# Patient Record
Sex: FEMALE | Race: WHITE
[De-identification: names, ages, dates, MRNs, and addresses within clinical notes are randomized per-mention and may not be internally consistent; named-entity substitution may affect disease eponyms.]

---

## 2017-04-26 ENCOUNTER — HOSPITAL ENCOUNTER (EMERGENCY)
Dept: HOSPITAL 41 - JD.ED | Age: 27
Discharge: HOME | End: 2017-04-26
Payer: COMMERCIAL

## 2017-04-26 DIAGNOSIS — Z3A.08: ICD-10-CM

## 2017-04-26 DIAGNOSIS — R10.32: ICD-10-CM

## 2017-04-26 DIAGNOSIS — O26.891: Primary | ICD-10-CM

## 2017-04-26 PROCEDURE — 36415 COLL VENOUS BLD VENIPUNCTURE: CPT

## 2017-04-26 PROCEDURE — 86850 RBC ANTIBODY SCREEN: CPT

## 2017-04-26 PROCEDURE — 81001 URINALYSIS AUTO W/SCOPE: CPT

## 2017-04-26 PROCEDURE — 86901 BLOOD TYPING SEROLOGIC RH(D): CPT

## 2017-04-26 PROCEDURE — 80053 COMPREHEN METABOLIC PANEL: CPT

## 2017-04-26 PROCEDURE — 76817 TRANSVAGINAL US OBSTETRIC: CPT

## 2017-04-26 PROCEDURE — 86900 BLOOD TYPING SEROLOGIC ABO: CPT

## 2017-04-26 PROCEDURE — 99284 EMERGENCY DEPT VISIT MOD MDM: CPT

## 2017-04-26 PROCEDURE — 96361 HYDRATE IV INFUSION ADD-ON: CPT

## 2017-04-26 PROCEDURE — 84703 CHORIONIC GONADOTROPIN ASSAY: CPT

## 2017-04-26 PROCEDURE — 86140 C-REACTIVE PROTEIN: CPT

## 2017-04-26 PROCEDURE — 96360 HYDRATION IV INFUSION INIT: CPT

## 2017-04-26 PROCEDURE — 85025 COMPLETE CBC W/AUTO DIFF WBC: CPT

## 2017-04-26 NOTE — US
First trimester obstetrical ultrasound: Multiple real-time images were

 obtained transvaginally.

 

Comparison: No previous study.

 

Small intrauterine gestational sac is seen.  No fetal pole or yolk sac

 is seen which may relate to the small size of the gestational sac.  

Varicosities are identified within the pelvis.  Maternal ovaries are 

unremarkable.

 

Measurements:

Gestational sac: 0.73 cm - 5 weeks 2 days

 

Impression:

1.  Small intrauterine gestational sac with no fetal pole or yolk sac 

is identified.  This may relate to early age of pregnancy, recommend 

follow-up exam in 10 days to further evaluate.

2.  Incidental varicosities within the pelvis but can cause symptoms 

of pelvic congestion.

3.  No additional abnormality is identified.

 

Diagnostic code #3

## 2017-04-26 NOTE — EDM.PDOC
ED HPI PREGNANCY





- General


Chief Complaint: OB/GYN Problem


Stated Complaint: 7 WEEKS PREGNANT, ABDOMINAL PAIN


Time Seen by Provider: 17 17:12


Source of Information: Reports: Patient


History Limitations: Reports: No limitations





- History of Present Illness


INITIAL COMMENTS - FREE TEXT/NARRATIVE: 


Patient is a 26-year-old female who reports she 7 weeks 5 days pregnant.  

Presents the ED complaining of left lower quadrant abdominal/pelvic pain.  

Patient states the pain comes and goes every 10-15 minutes.  At its peak pain 

is described as sharp, dull, throbbing rated a 7/10.  Pain is worsened with 

sitting or bending for.  Occasionally she has some low back pain with it.  She 

has not taken any medications to alleviate this discomfort.  States there is 

nothing that helps but leading that resolved on its own.  She's had no pain 

with urination.  States her bowel movements have increased to 5 per day.  She 

is resting as being soft, with no blood, and no straining her car.  Last bowel 

movement was at 1320 today.  She denies any increased vaginal.  She denies any 

history of similar symptoms.  She does note follicle cyst approximately one 

year ago bilaterally.  She had intercourse 2 days ago with no increased pain 

noted.  She denies any fever/chills, dysuria, dizziness, diarrhea, shortness 

breath, chest pain.





Last menstrual cycle was 2017.  She is 7 weeks 3 days pregnant.  She 

neck followup visit is with Dr. Condon the 2017.  Pregnancy history 

 5 para 2  one miscarriage one





Past medical history no additional contributory history.  Current medications 

prenatal.  Surgical history ear tube.





Patient does not smoke/recently quit, on-call negative, recreational drugs 

negative.


Timing/Duration: Reports: Intermittent


Location, Pregnancy: Reports: abdomen (Left lower quadrant)


Quality: Reports: ache, stabbing, throbbing, sharp


Severity: moderate (At its peak)


Improves with: Reports: None


Worsens with: Reports: Other (Sitting and bending forward)


Associated Symptoms: Denies: vaginal bleeding, vaginal clots, vaginal tissue, 

vaginal discharge, vaginal fluid


Treatments PTA: Reports: Other (see below) (None stated)





- Related Data


Allergies/ADRs: 


 Allergies











Allergy/AdvReac Type Severity Reaction Status Date / Time


 


No Known Allergies Allergy   Verified 17 17:06











Home Meds: 


 Home Meds





. [No Known Home Meds]  17 [History]











ED ROS GENERAL





- Review of Systems


Review Of Systems: See Below


Constitutional: Reports: no symptoms


Respiratory: Reports: No Symptoms


Cardiovascular: Reports: No symptoms


GI/Abdominal: Reports: Abdominal pain.  Denies: Black stool, Bloody stool, 

Constipation, Diarrhea, Decreased appetite, Distension, Flatus, Melena, Nausea, 

Vomiting


: Denies: dysuria, flank pain, frequency, hematuria, urgency


Musculoskeletal: Reports: back pain (Intermittent low back pain with onset)


Neurological: Denies: Dizziness





ED EXAM PREGNANCY





- Physical Exam


Exam: See Below


Exam Limited By: No limitations


General Appearance: alert, WD/WN, no apparent distress


Ears: hearing grossly normal


Nose: normal inspection


Throat/Mouth: Normal voice, No airway compromise


Neck: normal inspection


Respiratory/Chest: no respiratory distress, lungs clear, normal breath sounds


Cardiovascular: normal peripheral pulses, regular rate, rhythm, no murmur


GI/Abdominal: normal bowel sounds, soft, no organomegaly, no distention, tender 

(To the left lower quadrant/pelvic region.)


Back Exam: normal inspection.  No: CVA tenderness (L), CVA tenderness (R)


Neurological: alert, oriented, CN II-XII intact, normal cognition, no motor/

sensory deficits


Psychiatric: normal affect, normal mood


Skin Exam: Warm, Dry, Intact, Normal color





Course





- Vital Signs


Last Recorded V/S: 


 Last Vital Signs











Temp  97.3 F   17 17:06


 


Pulse  87   17 17:06


 


Resp  16   17 17:06


 


BP  106/72   17 17:06


 


Pulse Ox  99   17 17:06














- Orders/Labs/Meds


Orders: 


 Active Orders 24 hr











 Category Date Time Status


 


 Peripheral IV Care [RC] .AS DIRECTED Care  17 17:32 Active


 


 PATIENT RETYPE [BBK] Stat Lab  17 17:48 Results


 


 TYPE AND SCREEN [BBK] Stat Lab  17 17:48 Results


 


 Peripheral IV Insertion Adult [OM.PC] Stat Oth  17 17:32 Ordered











Labs: 


 Laboratory Tests











  17 Range/Units





  17:48 17:48 17:48 


 


WBC  11.44 H    (3.98-10.04)  K/mm3


 


RBC  4.89    (3.98-5.22)  M/mm3


 


Hgb  15.0    (11.2-15.7)  gm/L


 


Hct  43.8    (34.1-44.9)  %


 


MCV  89.6    (79.4-94.8)  fl


 


MCH  30.7    (25.6-32.2)  pg


 


MCHC  34.2    (32.2-35.5)  g/dl


 


RDW Std Deviation  41.1    (36.4-46.3)  fL


 


Plt Count  221    (182-369)  K/mm3


 


MPV  10.6    (9.4-12.3)  fl


 


Neut % (Auto)  69.1    (34.0-71.1)  %


 


Lymph % (Auto)  21.1    (19.3-51.7)  %


 


Mono % (Auto)  8.6    (4.7-12.5)  %


 


Eos % (Auto)  0.8    (0.7-5.8)  


 


Baso % (Auto)  0.2    (0.1-1.2)  %


 


Neut # (Auto)  7.92 H    (1.56-6.13)  K/mm3


 


Lymph # (Auto)  2.41    (1.18-3.74)  K/mm3


 


Mono # (Auto)  0.98 H    (0.24-0.36)  K/mm3


 


Eos # (Auto)  0.09    (0.04-0.36)  K/mm3


 


Baso # (Auto)  0.02    (0.01-0.08)  K/mm3


 


Sodium   139   (136-145)  mEq/L


 


Potassium   3.7   (3.5-5.1)  mEq/L


 


Chloride   105   ()  mEq/L


 


Carbon Dioxide   25   (21-32)  mEq/L


 


Anion Gap   12.7   (5-15)  


 


BUN   7   (7-18)  mg/dL


 


Creatinine   0.6   (0.55-1.02)  mg/dL


 


Est Cr Clr Drug Dosing   TNP   


 


Estimated GFR (MDRD)   > 60   (>60)  mL/min


 


BUN/Creatinine Ratio   11.7 L   (14-18)  


 


Glucose   107 H   ()  mg/dL


 


Calcium   9.2   (8.5-10.1)  mg/dL


 


Total Bilirubin   1.3 H   (0.2-1.0)  mg/dL


 


AST   19   (15-37)  U/L


 


ALT   32   (14-59)  U/L


 


Alkaline Phosphatase   65   ()  U/L


 


C-Reactive Protein   < 0.2   (<1.0)  mg/dL


 


Total Protein   7.4   (6.4-8.2)  g/dl


 


Albumin   4.2   (3.4-5.0)  g/dl


 


Globulin   3.2   gm/dL


 


Albumin/Globulin Ratio   1.3   (1-2)  


 


HCG, Qual     (NEGATIVE)  


 


Urine Color     (Yellow)  


 


Urine Appearance     (Clear)  


 


Urine pH     (5.0-8.0)  


 


Ur Specific Gravity     (1.005-1.030)  


 


Urine Protein     (Negative)  


 


Urine Glucose (UA)     (Negative)  


 


Urine Ketones     (Negative)  


 


Urine Occult Blood     (Negative)  


 


Urine Nitrite     (Negative)  


 


Urine Bilirubin     (Negative)  


 


Urine Urobilinogen     (0.2-1.0)  


 


Ur Leukocyte Esterase     (Negative)  


 


Urine RBC     (0-5)  /hpf


 


Urine WBC     (0-5)  /hpf


 


Ur Epithelial Cells     


 


Ur Squamous Epith Cells     (0-5)  /hpf


 


Amorphous Sediment     (NOT SEEN)  /hpf


 


Urine Bacteria     (FEW)  /hpf


 


Urine Mucus     (FEW)  /hpf


 


Blood Type    O NEGATIVE  


 


Gel Antibody Screen    Negative  














  17 Range/Units





  17:48 18:00 


 


WBC    (3.98-10.04)  K/mm3


 


RBC    (3.98-5.22)  M/mm3


 


Hgb    (11.2-15.7)  gm/L


 


Hct    (34.1-44.9)  %


 


MCV    (79.4-94.8)  fl


 


MCH    (25.6-32.2)  pg


 


MCHC    (32.2-35.5)  g/dl


 


RDW Std Deviation    (36.4-46.3)  fL


 


Plt Count    (182-369)  K/mm3


 


MPV    (9.4-12.3)  fl


 


Neut % (Auto)    (34.0-71.1)  %


 


Lymph % (Auto)    (19.3-51.7)  %


 


Mono % (Auto)    (4.7-12.5)  %


 


Eos % (Auto)    (0.7-5.8)  


 


Baso % (Auto)    (0.1-1.2)  %


 


Neut # (Auto)    (1.56-6.13)  K/mm3


 


Lymph # (Auto)    (1.18-3.74)  K/mm3


 


Mono # (Auto)    (0.24-0.36)  K/mm3


 


Eos # (Auto)    (0.04-0.36)  K/mm3


 


Baso # (Auto)    (0.01-0.08)  K/mm3


 


Sodium    (136-145)  mEq/L


 


Potassium    (3.5-5.1)  mEq/L


 


Chloride    ()  mEq/L


 


Carbon Dioxide    (21-32)  mEq/L


 


Anion Gap    (5-15)  


 


BUN    (7-18)  mg/dL


 


Creatinine    (0.55-1.02)  mg/dL


 


Est Cr Clr Drug Dosing    


 


Estimated GFR (MDRD)    (>60)  mL/min


 


BUN/Creatinine Ratio    (14-18)  


 


Glucose    ()  mg/dL


 


Calcium    (8.5-10.1)  mg/dL


 


Total Bilirubin    (0.2-1.0)  mg/dL


 


AST    (15-37)  U/L


 


ALT    (14-59)  U/L


 


Alkaline Phosphatase    ()  U/L


 


C-Reactive Protein    (<1.0)  mg/dL


 


Total Protein    (6.4-8.2)  g/dl


 


Albumin    (3.4-5.0)  g/dl


 


Globulin    gm/dL


 


Albumin/Globulin Ratio    (1-2)  


 


HCG, Qual  Positive H   (NEGATIVE)  


 


Urine Color   Light yellow  (Yellow)  


 


Urine Appearance   Clear  (Clear)  


 


Urine pH   7.0  (5.0-8.0)  


 


Ur Specific Gravity   1.025  (1.005-1.030)  


 


Urine Protein   Negative  (Negative)  


 


Urine Glucose (UA)   Negative  (Negative)  


 


Urine Ketones   Negative  (Negative)  


 


Urine Occult Blood   Negative  (Negative)  


 


Urine Nitrite   Negative  (Negative)  


 


Urine Bilirubin   Negative  (Negative)  


 


Urine Urobilinogen   1.0  (0.2-1.0)  


 


Ur Leukocyte Esterase   Negative  (Negative)  


 


Urine RBC   Not seen  (0-5)  /hpf


 


Urine WBC   0-5  (0-5)  /hpf


 


Ur Epithelial Cells   Not Reportable  


 


Ur Squamous Epith Cells   0-5  (0-5)  /hpf


 


Amorphous Sediment   Many H  (NOT SEEN)  /hpf


 


Urine Bacteria   Moderate H  (FEW)  /hpf


 


Urine Mucus   Not seen  (FEW)  /hpf


 


Blood Type    


 


Gel Antibody Screen    











Meds: 


Medications














Discontinued Medications














Generic Name Dose Route Start Last Admin





  Trade Name Freq  PRN Reason Stop Dose Admin


 


Sodium Chloride  1,000 mls @ 125 mls/hr  17 17:45  17 17:53





  Normal Saline  IV   125 mls/hr





  ASDIRECTED OUMOU   Administration


 


Sodium Chloride  10 ml  17 17:32  17 17:54





  Saline Flush  FLUSH   10 ml





  ASDIRECTED PRN   Administration





  Keep Vein Open   














- Re-Assessments/Exams


Free Text/Narrative Re-Assessment/Exam: 





17 17:29 Ordered peripheral IV with NS 125mls/hr, CBC, chem 14, CRP, hCG 

quantitative, type and screen, UA, and transvaginal OB ultrasound.  





Labs reviewed: White blood cell count 11.44, hemoglobin 16.0, chemistry panel 

essentially normal, CRP less than 0.2.  UA negative for infectious properties.





17 19:03 Transvaginal ultrasound impression: Small intrauterine 

gestational sac with no fetal pole or yolk sac is identified.  This may related 

to early age of pregnancy, recommend followup exam in 10 days to further 

evaluate.  Incidental varicosities within the pelvis but can cause symptoms of 

pelvic congestion.  No additional abnormalities identified.  Gestational sac 

measurements: 0.73 cm-5 weeks 2 days.





17 19:05 HCG quantitative was not ordered.  Ordered at this time. 





Blood type O Negative.  





17 19:21 Shared results of labs and ultrasound with patient.  She is 

ready to be discharged home. Discharge instructions as documented. 











Departure





- Departure


Time of Disposition: 19:21


Disposition: Home, Self-Care 01


Condition: good


Clinical Impression: 


 Intrauterine pregnancy, Abdominal pain during intrauterine pregnancy





Instructions:  Abdominal Pain During Pregnancy, Easy-to-Read


Referrals: 


Cindy Tovar MD [Primary Care Provider] - 


Forms:  ED Department Discharge


Additional Instructions: 


Follow up with Dr. Tovar in 10 days for reevaluation. Take tylenol 650mg 

every 6 hours for pain.  Return to the E.D. for any new or worsening symptoms. 





- My Orders


Last 24 Hours: 


My Active Orders





17 17:32


Peripheral IV Care [RC] .AS DIRECTED 


Peripheral IV Insertion Adult [OM.PC] Stat 





17 17:48


PATIENT RETYPE [BBK] Stat 


TYPE AND SCREEN [BBK] Stat 














- Assessment/Plan


Last 24 Hours: 


My Active Orders





17 17:32


Peripheral IV Care [RC] .AS DIRECTED 


Peripheral IV Insertion Adult [OM.PC] Stat 





17 17:48


PATIENT RETYPE [BBK] Stat 


TYPE AND SCREEN [BBK] Stat

## 2018-04-30 ENCOUNTER — HOSPITAL ENCOUNTER (INPATIENT)
Dept: HOSPITAL 41 - JD.OBCHECK | Age: 28
LOS: 2 days | Discharge: HOME | DRG: 560 | End: 2018-05-02
Attending: OBSTETRICS & GYNECOLOGY | Admitting: OBSTETRICS & GYNECOLOGY
Payer: COMMERCIAL

## 2018-04-30 DIAGNOSIS — F17.200: ICD-10-CM

## 2018-04-30 DIAGNOSIS — Z3A.37: ICD-10-CM

## 2018-04-30 PROCEDURE — 10907ZC DRAINAGE OF AMNIOTIC FLUID, THERAPEUTIC FROM PRODUCTS OF CONCEPTION, VIA NATURAL OR ARTIFICIAL OPENING: ICD-10-PCS | Performed by: OBSTETRICS & GYNECOLOGY

## 2018-04-30 PROCEDURE — 3E0R3BZ INTRODUCTION OF ANESTHETIC AGENT INTO SPINAL CANAL, PERCUTANEOUS APPROACH: ICD-10-PCS

## 2018-04-30 PROCEDURE — 00HU33Z INSERTION OF INFUSION DEVICE INTO SPINAL CANAL, PERCUTANEOUS APPROACH: ICD-10-PCS

## 2018-04-30 NOTE — PCM.PREANE
Preanesthetic Assessment





- Procedure


Proposed Procedure: 





Spinal for labor 





- Anesthesia/Transfusion/Family Hx


Anesthesia History: Prior Anesthesia Without Reaction


Family History of Anesthesia Reaction: No


Transfusion History: No Prior Transfusion(s)


Intubation History: Unknown





- Review of Systems


General: No Symptoms


Pulmonary: No Symptoms


Cardiovascular: No Symptoms


Gastrointestinal: Other (GERD)


Neurological: No Symptoms


Other: Reports: Diabetes (Gestational )





- Physical Assessment


NPO Status Date: 04/30/18


NPO Status Time: 13:00


Pulse: 65


O2 Sat by Pulse Oximetry: 98


Respiratory Rate: 14


Blood Pressure: 107/58


Temperature: 36.1 C


Height: 1.63 m


Weight: 88.904 kg


ASA Class: 2


Mental Status: Alert & Oriented x3


Airway Class: Mallampati = 1


Dentition: Reports: Normal Dentition


Thyro-Mental Finger Breadths: 3


Mouth Opening Finger Breadths: 3


ROM/Head Extension: Full


Lungs: Clear to Auscultation, Normal Respiratory Effort


Cardiovascular: Regular Rate, Regular Rhythm





- Lab


Values: 





 Laboratory Last Values











WBC  13.98 K/mm3 (3.98-10.04)  H  04/30/18  16:36    


 


RBC  4.27 M/mm3 (3.98-5.22)   04/30/18  16:36    


 


Hgb  12.1 gm/L (11.2-15.7)   04/30/18  16:36    


 


Hct  36.0 % (34.1-44.9)   04/30/18  16:36    


 


MCV  84.3 fl (79.4-94.8)   04/30/18  16:36    


 


MCH  28.3 pg (25.6-32.2)   04/30/18  16:36    


 


MCHC  33.6 g/dl (32.2-35.5)   04/30/18  16:36    


 


RDW Std Deviation  43.1 fL (36.4-46.3)   04/30/18  16:36    


 


Plt Count  213 K/mm3 (182-369)   04/30/18  16:36    


 


MPV  11.2 fl (9.4-12.3)   04/30/18  16:36    


 


Neut % (Auto)  77.2 % (34.0-71.1)  H  04/30/18  16:36    


 


Lymph % (Auto)  11.1 % (19.3-51.7)  L  04/30/18  16:36    


 


Mono % (Auto)  10.4 % (4.7-12.5)   04/30/18  16:36    


 


Eos % (Auto)  0.6  (0.7-5.8)  L  04/30/18  16:36    


 


Baso % (Auto)  0.1 % (0.1-1.2)   04/30/18  16:36    


 


Neut # (Auto)  10.80 K/mm3 (1.56-6.13)  H  04/30/18  16:36    


 


Lymph # (Auto)  1.55 K/mm3 (1.18-3.74)   04/30/18  16:36    


 


Mono # (Auto)  1.45 K/mm3 (0.24-0.36)  H  04/30/18  16:36    


 


Eos # (Auto)  0.09 K/mm3 (0.04-0.36)   04/30/18  16:36    


 


Baso # (Auto)  0.01 K/mm3 (0.01-0.08)   04/30/18  16:36    


 


POC Glucose  96 mg/dL ()   04/30/18  16:50    














- Allergies


Allergies/Adverse Reactions: 


 Allergies











Allergy/AdvReac Type Severity Reaction Status Date / Time


 


No Known Allergies Allergy   Verified 04/26/17 17:06














- Blood


Blood Available: No


Product(s) Available: None





- Anesthesia Plan


Pre-Op Medication Ordered: None





- Acknowledgements


Anesthesia Type Planned: Spinal (Pt desired a spinal rather than an epidural at 

this time.  Pt dilated to an 8.  She is aware of the spinal only lasting about 

an hour and then will not have any regional pain control thereafter and still 

wishes to proceed.  )


Pt an Appropriate Candidate for the Planned Anesthesia: Yes


Alternatives and Risks of Anesthesia Discussed w Pt/Guardian: Yes


Pt/Guardian Understands and Agrees with Anesthesia Plan: Yes





PreAnesthesia Questionnaire





- Past Health History


Medical/Surgical History: Denies Medical/Surgical History





- SUBSTANCE USE


Smoking Status *Q: Current Some Day Smoker


Recreational Drug Use History: No





- HOME MEDS


Home Medications: 


 Home Meds





. [No Known Home Meds]  04/26/17 [History]











- CURRENT (IN HOUSE) MEDS


Current Meds: 





 Current Medications





Lactated Ringer's (Ringers, Lactated)  1,000 mls @ 100 mls/hr IV ASDIRECTED OUMOU


   Last Admin: 04/30/18 17:02 Dose:  100 mls/hr


Oxytocin/Lactated Ringer's (Pitocin In Lr 10 Units/1,000 Ml)  10 unit in 1,000 

mls @ 500 mls/hr IV .CONTINUOUS OUMOU


Ampicillin Sodium 2 gm/ Sodium (Chloride)  100 mls @ 200 mls/hr IV Q6H OUMOU


   Last Admin: 04/30/18 17:04 Dose:  200 mls/hr


Nalbuphine HCl (Nubain)  10 mg IVPUSH Q2H PRN


   PRN Reason: Pain (moderate 4-6)


Sodium Chloride (Saline Flush)  10 ml FLUSH ASDIRECTED PRN


   PRN Reason: Keep Vein Open





Discontinued Medications





Fentanyl (Sublimaze) Confirm Administered Dose 100 mcg .ROUTE .STK-MED ONE


   Stop: 04/30/18 17:09


Oxytocin/Lactated Ringer's (Pitocin In Lr 10 Units/1,000 Ml) Confirm 

Administered Dose 10 unit in 1,000 mls @ as directed IV .STK-MED ONE


   Stop: 04/30/18 16:42


Sodium Chloride (Normal Saline) Confirm Administered Dose 100 mls @ as directed 

.ROUTE .STK-MED ONE


   Stop: 04/30/18 16:51


Nalbuphine HCl (Nubain) Confirm Administered Dose 20 mg .ROUTE .STK-MED ONE


   Stop: 04/30/18 16:34


   Last Admin: 04/30/18 16:52 Dose:  20 mg

## 2018-04-30 NOTE — PCM.LDHP
L&D History of Present Illness





- General


Date of Service: 18


Admit Problem/Dx: 


 Patient Status Order with Admit Dx/Problem





18 16:35


Patient Status [ADT] Routine 








 Admission Diagnosis/Problem











Admission Diagnosis/Problem    Pregnancy














Source of Information: Patient


History Limitations: Reports: No Limitations





- History of Present Illness


Introduction:: 





27 year old  at 37w3 here in labor, 


PNC complicated by A2GDM on oral medication and GBS positive.


Pain Score: 10





- Related Data


Allergies/Adverse Reactions: 


 Allergies











Allergy/AdvReac Type Severity Reaction Status Date / Time


 


No Known Allergies Allergy   Verified 17 17:06











Home Medications: 


 Home Meds





. [No Known Home Meds]  17 [History]











Past Medical History





- Past Health History


Medical/Surgical History: Denies Medical/Surgical History





Social & Family History





- Tobacco Use


Smoking Status *Q: Current Some Day Smoker


Years of Tobacco use: 3


Packs/Tins Daily: 0.1





- Caffeine Use


Caffeine Use: Reports: None





- Recreational Drug Use


Recreational Drug Use: No





H&P Review of Systems





- Review of Systems:


Review Of Systems: See Below


General: Reports: No Symptoms


HEENT: Reports: No Symptoms


Pulmonary: Reports: No Symptoms


Cardiovascular: Reports: No Symptoms


Gastrointestinal: Reports: No Symptoms


Musculoskeletal: Reports: No Symptoms


Skin: Reports: No Symptoms


Psychiatric: Reports: No Symptoms


Neurological: Reports: No Symptoms


Hematologic/Lymphatic: Reports: No Symptoms


Immunologic: Reports: No Symptoms





L&D Exam





- Exam


Exam: See Below





- OB Specific


Contraction Intensity: Moderate to Strong


Fetal Movement: Active


Fetal Heart Tones: Present


Fetal Heart Tones per Min: 150


Fetal Heart Rate (FHR) Variability: Moderate (6-25 bmp)


Birth Presentation: Vertex





- Evangelista Score


Evangelista Score Cervix Position: Midposition


Evangelista Score Consistency: Soft


Evangelista Score Effacement: >80%


Evangelista Score Dilation: > 5 cm


Evangelista Score Infant's Station: -1 ,0


Evangelista Score Total: 11





- Exam


General: Alert, Oriented


HEENT: PERRLA, Conjunctiva Clear, EACs Clear, EOMI, Hearing Intact, Mucosa 

Moist & Pink, Nares Patent, Normal Nasal Septum, Posterior Pharynx Clear, TMs 

Clear


Neck: Supple, Trachea Midline


Lungs: Clear to Auscultation, Normal Respiratory Effort


Cardiovascular: Regular Rate, Regular Rhythm


GI/Abdominal Exam: Normal Bowel Sounds, Soft, Non-Tender, No Organomegaly, No 

Distention, No Abnormal Bruit, No Mass, Pelvis Stable


Rectal Exam: Normal Rectal Tone


Genitourinary: Other (arom meconium fluid)


Back Exam: Normal Inspection, Full Range of Motion


Extremities: Normal Inspection, Normal Range of Motion, Non-Tender, No Pedal 

Edema, Normal Capillary Refill


Skin: Warm, Dry, Intact


Neurological: Cranial Nerves Intact, Reflexes Equal Bilateral


Psychiatric: Alert, Normal Affect, Normal Mood





- Patient Data


Lab Results Last 24 hrs: 


 Laboratory Results - last 24 hr











  18 Range/Units





  16:36 16:50 


 


WBC  13.98 H   (3.98-10.04)  K/mm3


 


RBC  4.27   (3.98-5.22)  M/mm3


 


Hgb  12.1   (11.2-15.7)  gm/L


 


Hct  36.0   (34.1-44.9)  %


 


MCV  84.3   (79.4-94.8)  fl


 


MCH  28.3   (25.6-32.2)  pg


 


MCHC  33.6   (32.2-35.5)  g/dl


 


RDW Std Deviation  43.1   (36.4-46.3)  fL


 


Plt Count  213   (182-369)  K/mm3


 


MPV  11.2   (9.4-12.3)  fl


 


Neut % (Auto)  77.2 H   (34.0-71.1)  %


 


Lymph % (Auto)  11.1 L   (19.3-51.7)  %


 


Mono % (Auto)  10.4   (4.7-12.5)  %


 


Eos % (Auto)  0.6 L   (0.7-5.8)  


 


Baso % (Auto)  0.1   (0.1-1.2)  %


 


Neut # (Auto)  10.80 H   (1.56-6.13)  K/mm3


 


Lymph # (Auto)  1.55   (1.18-3.74)  K/mm3


 


Mono # (Auto)  1.45 H   (0.24-0.36)  K/mm3


 


Eos # (Auto)  0.09   (0.04-0.36)  K/mm3


 


Baso # (Auto)  0.01   (0.01-0.08)  K/mm3


 


POC Glucose   96  ()  mg/dL











Result Diagrams: 


 18 16:36





Problem List Initiated/Reviewed/Updated: Yes


Orders Last 24hrs: 


 Active Orders 24 hr











 Category Date Time Status


 


 Patient Status [ADT] Routine ADT  18 16:35 Active


 


 Activity as Tolerated [RC] PFP Care  18 16:34 Active


 


 Communication Order [RC] ASDIRECTED Care  18 16:34 Active


 


 Fetal Heart Tones [RC] ASDIRECTED Care  18 16:35 Active


 


 Notify Provider [RC] PFP Care  18 16:34 Active


 


 Notify Provider [RC] PRN Care  18 16:34 Active


 


 Peripheral IV Care [RC] .AS DIRECTED Care  18 16:35 Active


 


 Pump Management, Intrathecal [RC] ASDIRECTED Care  18 16:36 Active


 


 Urinary Catheter Assessment [RC] ASDIRECTED Care  18 16:34 Active


 


 Vital Signs [RC] PER UNIT ROUTINE Care  18 16:34 Active


 


 Ampicillin 2 gm Med  18 17:00 Active





 Sodium Chloride 0.9% [Normal Saline] 100 ml   





 IV Q6H   


 


 Lactated Ringers [Ringers, Lactated] 1,000 ml Med  18 16:45 Active





 IV ASDIRECTED   


 


 Nalbuphine [Nubain] Med  18 16:34 Active





 10 mg IVPUSH Q2H PRN   


 


 Oxytocin/Lactated Ringers [Pitocin in LR 10 Units/1,000 Med  18 16:45 

Active





 ML]   





 10 unit in 1,000 ml IV .CONTINUOUS   


 


 Sodium Chloride 0.9% [Saline Flush] Med  18 16:34 Active





 10 ml FLUSH ASDIRECTED PRN   


 


 Electronic Fetal Heart Tones Ext w TOCO [WOMSER] Oth  18 16:34 Ordered





 Routine   


 


 Electronic Fetal Heart Tones Internal [WOMSER] Per Unit Oth  18 16:34 

Ordered





 Routine   


 


 Peripheral IV Insertion Adult [OM.PC] Routine Oth  18 16:34 Ordered


 


 Resuscitation Status Routine Resus Stat  18 16:34 Ordered








 Medication Orders





Lactated Ringer's (Ringers, Lactated)  1,000 mls @ 100 mls/hr IV ASDIRECTED OUMOU


   Last Admin: 18 17:02  Dose: 100 mls/hr


Oxytocin/Lactated Ringer's (Pitocin In Lr 10 Units/1,000 Ml)  10 unit in 1,000 

mls @ 500 mls/hr IV .CONTINUOUS OUMOU


Ampicillin Sodium 2 gm/ Sodium (Chloride)  100 mls @ 200 mls/hr IV Q6H [facility]


   Last Admin: 18 17:04  Dose: 200 mls/hr


Nalbuphine HCl (Nubain)  10 mg IVPUSH Q2H PRN


   PRN Reason: Pain (moderate 4-6)


Sodium Chloride (Saline Flush)  10 ml FLUSH ASDIRECTED PRN


   PRN Reason: Keep Vein Open








Assessment/Plan Comment:: 





Term labor.


Antibiotics

## 2018-05-01 NOTE — PCM.PNPP
- General Info


Date of Service: 18


Subjective Update: 





PPD1. Doing great. Minimal lochia. Minimal pain. More sore from pushing.


Functional Status: Reports: Pain Controlled





- Review of Systems


General: Reports: No Symptoms


HEENT: Reports: No Symptoms


Pulmonary: Reports: No Symptoms


Cardiovascular: Reports: No Symptoms


Gastrointestinal: Reports: No Symptoms


Genitourinary: Reports: No Symptoms


Musculoskeletal: Reports: No Symptoms


Skin: Reports: No Symptoms


Neurological: Reports: No Symptoms


Psychiatric: Reports: No Symptoms





- General Info


Date of Service: 18





- Patient Data


Vital Signs - Most Recent: 


 Last Vital Signs











Temp  36.1 C   18 17:36


 


Pulse  78   18 01:37


 


Resp  14   18 01:37


 


BP  111/61   18 01:37


 


Pulse Ox  97   18 01:37











Weight - Most Recent: 88.904 kg


Lab Results - Last 24 Hours: 


 Laboratory Results - last 24 hr











  18 Range/Units





  16:36 16:50 


 


WBC  13.98 H   (3.98-10.04)  K/mm3


 


RBC  4.27   (3.98-5.22)  M/mm3


 


Hgb  12.1   (11.2-15.7)  gm/L


 


Hct  36.0   (34.1-44.9)  %


 


MCV  84.3   (79.4-94.8)  fl


 


MCH  28.3   (25.6-32.2)  pg


 


MCHC  33.6   (32.2-35.5)  g/dl


 


RDW Std Deviation  43.1   (36.4-46.3)  fL


 


Plt Count  213   (182-369)  K/mm3


 


MPV  11.2   (9.4-12.3)  fl


 


Neut % (Auto)  77.2 H   (34.0-71.1)  %


 


Lymph % (Auto)  11.1 L   (19.3-51.7)  %


 


Mono % (Auto)  10.4   (4.7-12.5)  %


 


Eos % (Auto)  0.6 L   (0.7-5.8)  


 


Baso % (Auto)  0.1   (0.1-1.2)  %


 


Neut # (Auto)  10.80 H   (1.56-6.13)  K/mm3


 


Lymph # (Auto)  1.55   (1.18-3.74)  K/mm3


 


Mono # (Auto)  1.45 H   (0.24-0.36)  K/mm3


 


Eos # (Auto)  0.09   (0.04-0.36)  K/mm3


 


Baso # (Auto)  0.01   (0.01-0.08)  K/mm3


 


POC Glucose   96  ()  mg/dL











Med Orders - Current: 


 Current Medications





Benzocaine/Menthol (Dermoplast Pain Relief Spray)  0 gm TOP ASDIRECTED PRN


   PRN Reason: Perineal Comfort Measure


Ibuprofen (Motrin)  600 mg PO Q6H PRN


   PRN Reason: Mild pain or fever


   Last Admin: 18 21:49 Dose:  600 mg


Witch Hazel (Tucks)  1 pad TOP ASDIRECTED PRN


   PRN Reason: Hemorrhoid pain





Discontinued Medications





Fentanyl (Sublimaze) Confirm Administered Dose 100 mcg .ROUTE .STK-MED ONE


   Stop: 18 17:09


   Last Admin: 18 22:10 Dose:  Not Given


Lactated Ringer's (Ringers, Lactated)  1,000 mls @ 100 mls/hr IV ASDIRECTED OUMOU


   Last Admin: 18 17:02 Dose:  100 mls/hr


Oxytocin/Lactated Ringer's (Pitocin In Lr 10 Units/1,000 Ml)  10 unit in 1,000 

mls @ 500 mls/hr IV .CONTINUOUS OUMOU


Oxytocin/Lactated Ringer's (Pitocin In Lr 10 Units/1,000 Ml) Confirm 

Administered Dose 10 unit in 1,000 mls @ as directed IV .STK-MED ONE


   Stop: 18 16:42


   Last Admin: 18 17:58 Dose:  2 mls/hr


Sodium Chloride (Normal Saline) Confirm Administered Dose 100 mls @ as directed 

.ROUTE .STK-MED ONE


   Stop: 18 16:51


   Last Admin: 18 22:10 Dose:  Not Given


Ampicillin Sodium 2 gm/ Sodium (Chloride)  100 mls @ 200 mls/hr IV Q6H [facility]


   Last Admin: 18 17:04 Dose:  200 mls/hr


Nalbuphine HCl (Nubain) Confirm Administered Dose 20 mg .ROUTE .STK-MED ONE


   Stop: 18 16:34


   Last Admin: 18 16:52 Dose:  20 mg


Nalbuphine HCl (Nubain)  10 mg IVPUSH Q2H PRN


   PRN Reason: Pain (moderate 4-6)


Sodium Chloride (Saline Flush)  10 ml FLUSH ASDIRECTED PRN


   PRN Reason: Keep Vein Open











- Infant Interaction


Infant Disposition, Postpartum: [place] in Room with Family


Infant Interaction: Other (see below)


Infant Feeding:  Infant; Nursed Well


Support Person: 





- Postpartum Recovery Exam


Fundal Tone: Firm


Fundal Level: At Umbilicus


Fundal Placement: Midline


Lochia Amount: Small


Lochia Color: Rubra/Red


Perineum Description: Intact, Minimal Bruising/Swelling


Bladder Status: Voiding


Urinary Elimination: Voided





- Exam


General: Alert, Oriented


HEENT: Pupils Equal


Neck: Supple


Lungs: Clear to Auscultation, Normal Respiratory Effort


Cardiovascular: Regular Rate, Regular Rhythm


GI/Abdominal Exam: Normal Bowel Sounds, Soft, Non-Tender, No Organomegaly, No 

Distention, No Abnormal Bruit, No Mass, Pelvis Stable


Extremities: Normal Inspection, Normal Range of Motion, Non-Tender, No Pedal 

Edema, Normal Capillary Refill


Wound/Incisions: Healing Well


Neurological: No New Focal Deficit


Psy/Mental Status: Alert, Normal Affect, Normal Mood





- Problem List Review


Problem List Initiated/Reviewed/Updated: Yes





- My Orders


Last 24 Hours: 


My Active Orders





18 16:34


Resuscitation Status Routine 





18 16:36


Pump Management, Intrathecal [RC] ASDIRECTED 





18 17:58


Glucose [Blood Glucose Check, Bedside] [RC] ASDIRECTED 





18 20:18


Patient Status [ADT] Routine 


Activity as Tolerated [RC] PER UNIT ROUTINE 


Vital Signs [RC] 04,12,20 


Benzocaine/Menthol [Dermoplast Pain Relief Spray]   See Dose Instructions  TOP 

ASDIRECTED PRN 


Ibuprofen [Motrin]   600 mg PO Q6H PRN 


Witch Hazel [Tucks]   1 pad TOP ASDIRECTED PRN 


Assess Lochia [WOMSER] Per Unit Routine 


Assess Uterine Involution [WOMSER] Per Unit Routine 


Breast Pump [WOMSER] Per Unit Routine 


Heat Therapy [OM.PC] PRN 


Medication Administration Instruction [OM.PC] Routine 


Perineal Care [OM.PC] Per Unit Routine 


Sitz Bath [OM.PC] Per Unit Routine 





18 Breakfast


Regular Diet [DIET] 





18 00:43


RHOGAM, POSTPARTUM [RHIG WORKUP, POSTPARTUM] [BBK] Routine 





18 20:18


Heat Therapy [OM.PC] PRN 














- Assessment


Assessment:: 





PPD1


Doing great.


GBS positive only one dose of antibiotics. Discharge tomorrow.

## 2018-05-02 VITALS — DIASTOLIC BLOOD PRESSURE: 65 MMHG | SYSTOLIC BLOOD PRESSURE: 101 MMHG

## 2018-05-02 NOTE — [id number]
Post Anesthesia Note





- EVALUATION WITHIN 48HRS OF ANESTHETIC


Vital Signs in Normal Range: Yes


Patient Participated in Evaluation: Yes


Respiratory Function Stable: Yes


Airway Patent: Yes


Cardiovascular Function Stable: Yes


Hydration Status Stable: Yes


Pain Control Satisfactory: Yes


Nausea and Vomiting Control Satisfactory: Yes


Mental Status Recovered: Yes


Pulse Rate: 75


Resp Rate: 16


Temperature: 36.5 C


Blood Pressure: 105/67





- COMMENTS/OBSERVATIONS


Free Text/Narrative:: 





no anesthesia complications noted